# Patient Record
Sex: MALE | Race: WHITE | NOT HISPANIC OR LATINO | URBAN - METROPOLITAN AREA
[De-identification: names, ages, dates, MRNs, and addresses within clinical notes are randomized per-mention and may not be internally consistent; named-entity substitution may affect disease eponyms.]

---

## 2022-09-12 ENCOUNTER — WEB ENCOUNTER (OUTPATIENT)
Dept: URBAN - METROPOLITAN AREA CLINIC 52 | Facility: CLINIC | Age: 47
End: 2022-09-12

## 2022-09-12 ENCOUNTER — LAB OUTSIDE AN ENCOUNTER (OUTPATIENT)
Dept: URBAN - METROPOLITAN AREA CLINIC 52 | Facility: CLINIC | Age: 47
End: 2022-09-12

## 2022-09-12 ENCOUNTER — CLAIMS CREATED FROM THE CLAIM WINDOW (OUTPATIENT)
Dept: URBAN - METROPOLITAN AREA CLINIC 52 | Facility: CLINIC | Age: 47
End: 2022-09-12
Payer: SELF-PAY

## 2022-09-12 VITALS
SYSTOLIC BLOOD PRESSURE: 131 MMHG | HEIGHT: 73 IN | TEMPERATURE: 97.3 F | DIASTOLIC BLOOD PRESSURE: 90 MMHG | BODY MASS INDEX: 21.95 KG/M2 | OXYGEN SATURATION: 96 % | HEART RATE: 105 BPM | WEIGHT: 165.6 LBS

## 2022-09-12 DIAGNOSIS — R11.2 NAUSEA AND VOMITING, UNSPECIFIED VOMITING TYPE: ICD-10-CM

## 2022-09-12 DIAGNOSIS — K76.0 FATTY LIVER: ICD-10-CM

## 2022-09-12 DIAGNOSIS — R19.7 DIARRHEA, UNSPECIFIED TYPE: ICD-10-CM

## 2022-09-12 DIAGNOSIS — K92.1 BLOOD IN STOOL: ICD-10-CM

## 2022-09-12 DIAGNOSIS — K81.9 CHOLECYSTITIS: ICD-10-CM

## 2022-09-12 DIAGNOSIS — K29.80 DUODENITIS: ICD-10-CM

## 2022-09-12 DIAGNOSIS — R10.84 GENERALIZED ABDOMINAL PAIN: ICD-10-CM

## 2022-09-12 DIAGNOSIS — R63.4 UNINTENTIONAL WEIGHT LOSS: ICD-10-CM

## 2022-09-12 PROBLEM — 72007001: Status: ACTIVE | Noted: 2022-09-12

## 2022-09-12 PROCEDURE — 99204 OFFICE O/P NEW MOD 45 MIN: CPT | Performed by: INTERNAL MEDICINE

## 2022-09-12 PROCEDURE — 99204 OFFICE O/P NEW MOD 45 MIN: CPT | Performed by: PHYSICIAN ASSISTANT

## 2022-09-12 RX ORDER — ONDANSETRON HYDROCHLORIDE 4 MG/1
1 TABLET TABLET, FILM COATED ORAL
Qty: 30 | Refills: 1 | OUTPATIENT
Start: 2022-09-12

## 2022-09-12 RX ORDER — KETOROLAC TROMETHAMINE 10 MG/1
TAKE 1 TABLET BY MOUTH TWICE DAILY TABLET, FILM COATED ORAL
Qty: 20 EACH | Refills: 0 | Status: ON HOLD | COMMUNITY

## 2022-09-12 RX ORDER — DICYCLOMINE HYDROCHLORIDE 20 MG/1
1 TABLET TABLET ORAL
Qty: 90 | Refills: 0 | OUTPATIENT
Start: 2022-09-12 | End: 2022-10-12

## 2022-09-12 RX ORDER — OMEPRAZOLE 20 MG/1
1 CAPSULE 30 MINUTES BEFORE MORNING MEAL CAPSULE, DELAYED RELEASE ORAL ONCE A DAY
Qty: 30 | Refills: 2 | OUTPATIENT
Start: 2022-09-12

## 2022-09-12 NOTE — PHYSICAL EXAM GASTROINTESTINAL
Abdomen , soft, nondistended, RUQ tenderness on palpation, no guarding or rigidity , no masses palpable , normal bowel sounds

## 2022-09-12 NOTE — PHYSICAL EXAM HENT:
Head,  normocephalic,  atraumatic,  Face,  Face within normal limits,  Ears,  External ears within normal limits Statement Selected

## 2022-09-12 NOTE — HPI-TODAY'S VISIT:
This is a 47 y.o. male with no significant PMH who presents to office with multiple complaints. According to patient, he has had abdominal pain, rectal bleeding, and diarrhea for two months.  Abdominal pain is associated with bloating, decreased appetite, nausea and vomiting. He reports dark, "coca-cola" colored vomit; denies bloody vomit. He reports 2-3 watery stools a day. Bowel movements are associated with intermittent bright red blood in toilet and on tissue. He has loss 30 pounds, unintentionally, over the past year. He does report heavy alcohol use, and admits to drinking 12 beers a day x 20 years. CT of abdomen and pelvis on 8/17/22 suggestive of multi-focal colitis most prominent following the ascending segement; also suggestive of duodenitis and probable hepatic steatosis. There was mild gallbladder prominence with equivocal gallbladder wall thickening. RUQ u/s recommended.

## 2022-10-11 ENCOUNTER — CLAIMS CREATED FROM THE CLAIM WINDOW (OUTPATIENT)
Dept: URBAN - METROPOLITAN AREA SURGERY CENTER 17 | Facility: SURGERY CENTER | Age: 47
End: 2022-10-11
Payer: SELF-PAY

## 2022-10-11 ENCOUNTER — CLAIMS CREATED FROM THE CLAIM WINDOW (OUTPATIENT)
Dept: URBAN - METROPOLITAN AREA SURGERY CENTER 17 | Facility: SURGERY CENTER | Age: 47
End: 2022-10-11

## 2022-10-11 ENCOUNTER — CLAIMS CREATED FROM THE CLAIM WINDOW (OUTPATIENT)
Dept: URBAN - METROPOLITAN AREA CLINIC 4 | Facility: CLINIC | Age: 47
End: 2022-10-11
Payer: SELF-PAY

## 2022-10-11 DIAGNOSIS — R10.13 ABDOMINAL DISCOMFORT, EPIGASTRIC: ICD-10-CM

## 2022-10-11 DIAGNOSIS — R11.2 NAUSEA AND VOMITING, UNSPECIFIED VOMITING TYPE: ICD-10-CM

## 2022-10-11 DIAGNOSIS — K63.89 OTHER SPECIFIED DISEASES OF INTESTINE: ICD-10-CM

## 2022-10-11 DIAGNOSIS — K22.89 DILATATION OF ESOPHAGUS: ICD-10-CM

## 2022-10-11 DIAGNOSIS — K31.89 ACQUIRED DEFORMITY OF DUODENUM: ICD-10-CM

## 2022-10-11 DIAGNOSIS — K63.89 BACTERIAL OVERGROWTH SYNDROME: ICD-10-CM

## 2022-10-11 DIAGNOSIS — D12.5 ADENOMA OF SIGMOID COLON: ICD-10-CM

## 2022-10-11 DIAGNOSIS — K31.89 OTHER DISEASES OF STOMACH AND DUODENUM: ICD-10-CM

## 2022-10-11 DIAGNOSIS — R10.84 GENERALIZED ABDOMINAL PAIN: ICD-10-CM

## 2022-10-11 DIAGNOSIS — R11.2 ACUTE NAUSEA WITH NONBILIOUS VOMITING: ICD-10-CM

## 2022-10-11 DIAGNOSIS — R19.7 ACUTE DIARRHEA: ICD-10-CM

## 2022-10-11 DIAGNOSIS — K62.5 ANAL BLEEDING: ICD-10-CM

## 2022-10-11 PROCEDURE — 88342 IMHCHEM/IMCYTCHM 1ST ANTB: CPT | Performed by: PATHOLOGY

## 2022-10-11 PROCEDURE — G8907 PT DOC NO EVENTS ON DISCHARG: HCPCS | Performed by: INTERNAL MEDICINE

## 2022-10-11 PROCEDURE — 45385 COLONOSCOPY W/LESION REMOVAL: CPT | Performed by: INTERNAL MEDICINE

## 2022-10-11 PROCEDURE — 88312 SPECIAL STAINS GROUP 1: CPT | Performed by: PATHOLOGY

## 2022-10-11 PROCEDURE — 43239 EGD BIOPSY SINGLE/MULTIPLE: CPT | Performed by: INTERNAL MEDICINE

## 2022-10-11 PROCEDURE — 88305 TISSUE EXAM BY PATHOLOGIST: CPT | Performed by: PATHOLOGY

## 2022-10-11 PROCEDURE — 88313 SPECIAL STAINS GROUP 2: CPT | Performed by: PATHOLOGY

## 2022-10-11 PROCEDURE — 45380 COLONOSCOPY AND BIOPSY: CPT | Performed by: INTERNAL MEDICINE

## 2022-10-11 RX ORDER — ONDANSETRON HYDROCHLORIDE 4 MG/1
1 TABLET TABLET, FILM COATED ORAL
Qty: 30 | Refills: 1 | Status: ACTIVE | COMMUNITY
Start: 2022-09-12

## 2022-10-11 RX ORDER — DICYCLOMINE HYDROCHLORIDE 20 MG/1
1 TABLET TABLET ORAL
Qty: 90 | Refills: 0 | Status: ACTIVE | COMMUNITY
Start: 2022-09-12 | End: 2022-10-12

## 2022-10-11 RX ORDER — OMEPRAZOLE 40 MG/1
1 CAPSULE 30 MINUTES BEFORE MORNING MEAL CAPSULE, DELAYED RELEASE PELLETS ORAL ONCE A DAY
Qty: 30 | Refills: 6
Start: 2022-09-12

## 2022-10-11 RX ORDER — ONDANSETRON HYDROCHLORIDE 4 MG/1
1 TABLET TABLET, FILM COATED ORAL
Qty: 60 | Refills: 1
Start: 2022-09-12

## 2022-10-11 RX ORDER — DICYCLOMINE HYDROCHLORIDE 20 MG/1
1 TABLET TABLET ORAL
Qty: 90 | Refills: 6
Start: 2022-09-12 | End: 2023-05-09

## 2022-10-11 RX ORDER — OMEPRAZOLE 20 MG/1
1 CAPSULE 30 MINUTES BEFORE MORNING MEAL CAPSULE, DELAYED RELEASE ORAL ONCE A DAY
Qty: 30 | Refills: 2 | Status: ACTIVE | COMMUNITY
Start: 2022-09-12

## 2022-10-11 RX ORDER — KETOROLAC TROMETHAMINE 10 MG/1
TAKE 1 TABLET BY MOUTH TWICE DAILY TABLET, FILM COATED ORAL
Qty: 20 EACH | Refills: 0 | Status: ON HOLD | COMMUNITY

## 2022-11-07 ENCOUNTER — OFFICE VISIT (OUTPATIENT)
Dept: URBAN - METROPOLITAN AREA CLINIC 52 | Facility: CLINIC | Age: 47
End: 2022-11-07

## 2022-11-07 RX ORDER — OMEPRAZOLE 40 MG/1
1 CAPSULE 30 MINUTES BEFORE MORNING MEAL CAPSULE, DELAYED RELEASE PELLETS ORAL ONCE A DAY
Qty: 30 | Refills: 6 | Status: ON HOLD | COMMUNITY
Start: 2022-09-12

## 2022-11-07 RX ORDER — DICYCLOMINE HYDROCHLORIDE 20 MG/1
1 TABLET TABLET ORAL
Qty: 90 | Refills: 6 | Status: ON HOLD | COMMUNITY
Start: 2022-09-12 | End: 2023-05-09

## 2022-11-07 RX ORDER — ONDANSETRON HYDROCHLORIDE 4 MG/1
1 TABLET TABLET, FILM COATED ORAL
Qty: 60 | Refills: 1 | Status: ON HOLD | COMMUNITY
Start: 2022-09-12

## 2022-11-07 RX ORDER — KETOROLAC TROMETHAMINE 10 MG/1
TAKE 1 TABLET BY MOUTH TWICE DAILY TABLET, FILM COATED ORAL
Qty: 20 EACH | Refills: 0 | COMMUNITY

## 2022-11-30 ENCOUNTER — CLAIMS CREATED FROM THE CLAIM WINDOW (OUTPATIENT)
Dept: URBAN - METROPOLITAN AREA CLINIC 52 | Facility: CLINIC | Age: 47
End: 2022-11-30
Payer: SELF-PAY

## 2022-11-30 VITALS
HEART RATE: 123 BPM | BODY MASS INDEX: 21.44 KG/M2 | WEIGHT: 161.8 LBS | HEIGHT: 73 IN | OXYGEN SATURATION: 98 % | DIASTOLIC BLOOD PRESSURE: 84 MMHG | TEMPERATURE: 97.5 F | SYSTOLIC BLOOD PRESSURE: 137 MMHG

## 2022-11-30 DIAGNOSIS — K76.0 FATTY LIVER: ICD-10-CM

## 2022-11-30 DIAGNOSIS — K63.5 HYPERPLASTIC POLYP OF SIGMOID COLON: ICD-10-CM

## 2022-11-30 DIAGNOSIS — D12.5 ADENOMATOUS POLYP OF SIGMOID COLON: ICD-10-CM

## 2022-11-30 DIAGNOSIS — K21.00 GASTROESOPHAGEAL REFLUX DISEASE WITH ESOPHAGITIS WITHOUT HEMORRHAGE: ICD-10-CM

## 2022-11-30 PROBLEM — 197321007: Status: ACTIVE | Noted: 2022-09-12

## 2022-11-30 PROBLEM — 266433003: Status: ACTIVE | Noted: 2022-11-30

## 2022-11-30 PROCEDURE — 99213 OFFICE O/P EST LOW 20 MIN: CPT | Performed by: PHYSICIAN ASSISTANT

## 2022-11-30 PROCEDURE — 99213 OFFICE O/P EST LOW 20 MIN: CPT | Performed by: INTERNAL MEDICINE

## 2022-11-30 RX ORDER — OMEPRAZOLE 40 MG/1
1 CAPSULE 30 MINUTES BEFORE MORNING MEAL CAPSULE, DELAYED RELEASE PELLETS ORAL ONCE A DAY
Qty: 30 | Refills: 6 | Status: ACTIVE | COMMUNITY
Start: 2022-09-12

## 2022-11-30 RX ORDER — DICYCLOMINE HYDROCHLORIDE 20 MG/1
1 TABLET TABLET ORAL
Qty: 90 | Refills: 6 | Status: ACTIVE | COMMUNITY
Start: 2022-09-12 | End: 2023-05-09

## 2022-11-30 RX ORDER — ONDANSETRON HYDROCHLORIDE 4 MG/1
1 TABLET TABLET, FILM COATED ORAL
Qty: 60 | Refills: 1 | Status: ACTIVE | COMMUNITY
Start: 2022-09-12

## 2022-11-30 RX ORDER — KETOROLAC TROMETHAMINE 10 MG/1
TAKE 1 TABLET BY MOUTH TWICE DAILY TABLET, FILM COATED ORAL
Qty: 20 EACH | Refills: 0 | COMMUNITY

## 2022-11-30 NOTE — HPI-TODAY'S VISIT:
This is a 47 y.o. male with no significant PMH who initially presented to office with multiple complaints. Initially he, was c/o generalized abdominal pain, rectal bleeding, and diarrhea for two months.  Abdominal pain was associated with bloating, decreased appetite, nausea and vomiting. He reported dark, "coca-cola" colored vomit; denies bloody vomit. He was having 2-3 watery stools a day. Bowel movements were associated with intermittent bright red blood in toilet and on tissue. He was having weight loss of 30 pounds, unintentionally, over the past year. He reported heavy alcohol use, and admits to drinking 12 beers a day x 20 years. Stool studies were ordered on last office visit, but patient states cost was to expensive, and he did not get. His weight is stable. CT of abdomen and pelvis on 8/17/22 suggestive of multi-focal colitis most prominent following the ascending segment; also suggestive of duodenitis and probable hepatic steatosis. There was mild gallbladder prominence with equivocal gallbladder wall thickening. RUQ u/s recommended. Nausea, vomiting has resolved. Abdominal pain is much better with Omeprazole and Dicyclomine.  RUQ ultrasound on 11/10/22 with diffuse fatty liver. EGD 10/11/2022 (Dr. Wagner) with LA Grade C reflux esophagitis, medium-sized HH, and gastritis. Pathology negative for H. pylori. Colonoscopy 10/11/2022 (Dr. Wagner) with 5-mm descending colon polyp (tubular adenoma) and 10-mm sigmoid colon polyp (hyperplastic); redundant colon and IH.

## 2022-11-30 NOTE — PHYSICAL EXAM GASTROINTESTINAL
Abdomen , soft, nondistended, non-tender, no guarding or rigidity , no masses palpable , normal bowel sounds

## 2023-05-26 ENCOUNTER — OFFICE VISIT (OUTPATIENT)
Dept: URBAN - METROPOLITAN AREA CLINIC 52 | Facility: CLINIC | Age: 48
End: 2023-05-26

## 2023-09-05 ENCOUNTER — OFFICE VISIT (OUTPATIENT)
Dept: URBAN - METROPOLITAN AREA CLINIC 70 | Facility: CLINIC | Age: 48
End: 2023-09-05
Payer: SELF-PAY

## 2023-09-05 ENCOUNTER — LAB OUTSIDE AN ENCOUNTER (OUTPATIENT)
Dept: URBAN - METROPOLITAN AREA CLINIC 70 | Facility: CLINIC | Age: 48
End: 2023-09-05

## 2023-09-05 VITALS
SYSTOLIC BLOOD PRESSURE: 105 MMHG | BODY MASS INDEX: 24.57 KG/M2 | WEIGHT: 185.4 LBS | HEART RATE: 128 BPM | TEMPERATURE: 99.7 F | HEIGHT: 73 IN | DIASTOLIC BLOOD PRESSURE: 73 MMHG

## 2023-09-05 DIAGNOSIS — E87.1 HYPONATREMIA: ICD-10-CM

## 2023-09-05 DIAGNOSIS — K70.31 ALCOHOLIC CIRRHOSIS OF LIVER WITH ASCITES: ICD-10-CM

## 2023-09-05 PROBLEM — 420054005: Status: ACTIVE | Noted: 2023-09-05

## 2023-09-05 PROBLEM — 89627008: Status: ACTIVE | Noted: 2023-09-05

## 2023-09-05 PROCEDURE — 99215 OFFICE O/P EST HI 40 MIN: CPT | Performed by: INTERNAL MEDICINE

## 2023-09-05 RX ORDER — LORAZEPAM 1 MG/1
1 TABLET AT BEDTIME AS NEEDED TABLET ORAL ONCE A DAY
Status: ACTIVE | COMMUNITY

## 2023-09-05 RX ORDER — ONDANSETRON HYDROCHLORIDE 4 MG/1
1 TABLET TABLET, FILM COATED ORAL
Qty: 60 | Refills: 1 | Status: ACTIVE | COMMUNITY
Start: 2022-09-12

## 2023-09-05 RX ORDER — OMEPRAZOLE 40 MG/1
1 CAPSULE 30 MINUTES BEFORE MORNING MEAL CAPSULE, DELAYED RELEASE PELLETS ORAL ONCE A DAY
Qty: 30 | Refills: 6 | Status: ACTIVE | COMMUNITY
Start: 2022-09-12

## 2023-09-05 RX ORDER — KETOROLAC TROMETHAMINE 10 MG/1
TAKE 1 TABLET BY MOUTH TWICE DAILY TABLET, FILM COATED ORAL
Qty: 20 EACH | Refills: 0 | COMMUNITY

## 2023-09-05 NOTE — HPI-TODAY'S VISIT:
48-year-old male here for hospital discharge follow-up.  Patient presented to [facility] 08/18 with complaint of generalized abdominal pain.  Underwent CT scans that showed cirrhosis of the liver, large volume abdominal ascites, moderate size left pleural effusion with left basilar subsegmental atelectasis.  Ultrasound abdomen showed cirrhosis with splenomegaly, and ascites.  Gallbladder sludge without calculus or cholecystitis.  Patient underwent paracentesis and 9 L of clear mesfin ascites fluid was removed.  Blood work from 8/18 showed sodium 102, potassium 4.7, creatinine 1.61, calcium 7.4, ascites fluid noted negative for SBP.  Patient was admitted to ICU for hyponatremia. Patient's sodium improved gradually to 128 over the course of hospital stay.  Patient left AMA on 8/27, patient's sodium was 128 on the day he left AMA.   Of note patient went had repeat paracentesis on 8/25 and 8 L of fluid was removed.  Also noted to have acute encephalopathy during hospital stay, noted to be confused by the time he left the hospital.   At today's visit, patient appears slightly confused although alert and oriented to time, place and person. Patient reports having abdominal distension and generalized abdominal pain. Per family present at bedside, his most recent sodium 3 days ago was ?120 (documentation not available). Reports undocumented weight loss. Denies having nausea, vomiting, confusion, hematochezia, hematemesis.

## 2023-09-20 ENCOUNTER — OFFICE VISIT (OUTPATIENT)
Dept: URBAN - METROPOLITAN AREA CLINIC 70 | Facility: CLINIC | Age: 48
End: 2023-09-20
Payer: SELF-PAY

## 2023-09-20 VITALS
WEIGHT: 168.4 LBS | TEMPERATURE: 98.1 F | SYSTOLIC BLOOD PRESSURE: 108 MMHG | HEIGHT: 73 IN | HEART RATE: 109 BPM | BODY MASS INDEX: 22.32 KG/M2 | DIASTOLIC BLOOD PRESSURE: 69 MMHG

## 2023-09-20 DIAGNOSIS — K70.31 ALCOHOLIC CIRRHOSIS OF LIVER WITH ASCITES: ICD-10-CM

## 2023-09-20 DIAGNOSIS — K76.82 HEPATIC ENCEPHALOPATHY: ICD-10-CM

## 2023-09-20 PROCEDURE — 99215 OFFICE O/P EST HI 40 MIN: CPT | Performed by: INTERNAL MEDICINE

## 2023-09-20 RX ORDER — ONDANSETRON HYDROCHLORIDE 4 MG/1
1 TABLET TABLET, FILM COATED ORAL
Qty: 60 | Refills: 1 | Status: ACTIVE | COMMUNITY
Start: 2022-09-12

## 2023-09-20 RX ORDER — RIFAXIMIN 200 MG/1
1 TABLET TABLET ORAL TWICE A DAY
Qty: 180 TABLET | Refills: 3 | OUTPATIENT
Start: 2023-09-20 | End: 2024-09-14

## 2023-09-20 RX ORDER — KETOROLAC TROMETHAMINE 10 MG/1
TAKE 1 TABLET BY MOUTH TWICE DAILY TABLET, FILM COATED ORAL
Qty: 20 EACH | Refills: 0 | COMMUNITY

## 2023-09-20 RX ORDER — OMEPRAZOLE 40 MG/1
1 CAPSULE 30 MINUTES BEFORE MORNING MEAL CAPSULE, DELAYED RELEASE PELLETS ORAL ONCE A DAY
Qty: 30 | Refills: 6 | Status: ACTIVE | COMMUNITY
Start: 2022-09-12

## 2023-09-20 RX ORDER — LORAZEPAM 1 MG/1
1 TABLET AT BEDTIME AS NEEDED TABLET ORAL ONCE A DAY
Status: ACTIVE | COMMUNITY

## 2023-09-20 NOTE — HPI-TODAY'S VISIT:
The pt presents for a hospital f/u for cirrhosis. He was sent to the hospital by Dr. Hoover following his LOV.  He was hospitalized at [facility] 9/5/2023 - 9/15/2023 for hyponatremia.  While hospitalized, he was seen by nephrology.  GI was never consulted.  Labs 9/9/2023 showed Sodium 121, albumin 1.8, bili0.4, alk phos 95, AST 54, and ALT 33.  Labs 9/15/2023 showed Hgb 8.6, Hct 24.8, MCV 95, Plt 212, sodium 132, albumin 1.5, bili 0.6, alk phos 83, AST 42, and ALT 27.  He underwent a paracentesis on 9/6/2023 and 11,300ml of yellow clear fluid was removed.  Today he states he is feeling well.  Abdomen is distended, but he denies abdomial pain.  Weight has maintained.  He is alert and oriented to person, place, and time today.  He is no longer taking Lactulose which was given in the hospital for HE.  He has upcoming f/u with Nephrology in 2-3 weeks. Not currently on diuretics.  No current referral to [facility].  Reports abstinence from ETOH for the last 1.5 months.  His wife is present for the office visit today.

## 2023-09-20 NOTE — PHYSICAL EXAM HENT:
Head,  normocephalic,  atraumatic,  Face,  Face within normal limits,  Ears,  External ears within normal limits,  Nose/Nasopharynx,  External nose  normal appearance, Mouth and Throat,  Breath odor normal,  Lips,  Appearance normal I called Dr. Headley at [facility] about patient transfer.     Sammy Kwon  12/10/20 2029

## 2023-09-20 NOTE — HPI-OTHER HISTORIES
OV 9/5/2023 w/ Dr. Hoover: 48-year-old male here for hospital discharge follow-up.  Patient presented to [facility] 08/18 with complaint of generalized abdominal pain.  Underwent CT scans that showed cirrhosis of the liver, large volume abdominal ascites, moderate size left pleural effusion with left basilar subsegmental atelectasis.  Ultrasound abdomen showed cirrhosis with splenomegaly, and ascites.  Gallbladder sludge without calculus or cholecystitis.  Patient underwent paracentesis and 9 L of clear mesfin ascites fluid was removed.  Blood work from 8/18 showed sodium 102, potassium 4.7, creatinine 1.61, calcium 7.4, ascites fluid noted negative for SBP.  Patient was admitted to ICU for hyponatremia. Patient's sodium improved gradually to 128 over the course of hospital stay.  Patient left AMA on 8/27, patient's sodium was 128 on the day he left AMA.   Of note patient went had repeat paracentesis on 8/25 and 8 L of fluid was removed.  Also noted to have acute encephalopathy during hospital stay, noted to be confused by the time he left the hospital.   At today's visit, patient appears slightly confused although alert and oriented to time, place and person. Patient reports having abdominal distension and generalized abdominal pain. Per family present at bedside, his most recent sodium 3 days ago was ?120 (documentation not available). Reports undocumented weight loss. Denies having nausea, vomiting, confusion, hematochezia, hematemesis.

## 2023-09-22 ENCOUNTER — TELEPHONE ENCOUNTER (OUTPATIENT)
Dept: URBAN - METROPOLITAN AREA CLINIC 70 | Facility: CLINIC | Age: 48
End: 2023-09-22

## 2023-09-22 RX ORDER — RIFAXIMIN 550 MG/1
1 TABLET TABLET ORAL TWICE A DAY
Qty: 180 | Refills: 3
Start: 2023-09-20 | End: 2024-09-16

## 2023-09-25 LAB
A/G RATIO: 0.5
AFP, SERUM, TUMOR MARKER: 4.8
ALBUMIN: 2
ALKALINE PHOSPHATASE: 86
ALT (SGPT): 25
AST (SGOT): 33
BILIRUBIN, TOTAL: 0.6
BUN/CREATININE RATIO: (no result)
BUN: 18
CALCIUM: 7.2
CARBON DIOXIDE, TOTAL: 17
CHLORIDE: 102
CREATININE: 1.16
EGFR: 78
GLOBULIN, TOTAL: 3.7
GLUCOSE: 96
HEMATOCRIT: 25.8
HEMOGLOBIN: 9.3
INR: 1.1
MCH: 33
MCHC: 36
MCV: 91.5
MPV: 10
PLATELET COUNT: 177
POTASSIUM: 3.4
PROTEIN, TOTAL: 5.7
PT: 11.1
RDW: 12.5
RED BLOOD CELL COUNT: 2.82
SODIUM: 128
WHITE BLOOD CELL COUNT: 5.4

## 2023-09-27 ENCOUNTER — TELEPHONE ENCOUNTER (OUTPATIENT)
Dept: URBAN - METROPOLITAN AREA CLINIC 70 | Facility: CLINIC | Age: 48
End: 2023-09-27

## 2023-09-27 ENCOUNTER — LAB OUTSIDE AN ENCOUNTER (OUTPATIENT)
Dept: URBAN - METROPOLITAN AREA CLINIC 70 | Facility: CLINIC | Age: 48
End: 2023-09-27

## 2023-10-12 ENCOUNTER — TELEPHONE ENCOUNTER (OUTPATIENT)
Dept: URBAN - METROPOLITAN AREA CLINIC 70 | Facility: CLINIC | Age: 48
End: 2023-10-12

## 2023-10-17 ENCOUNTER — TELEPHONE ENCOUNTER (OUTPATIENT)
Dept: URBAN - METROPOLITAN AREA CLINIC 70 | Facility: CLINIC | Age: 48
End: 2023-10-17

## 2023-10-18 ENCOUNTER — TELEPHONE ENCOUNTER (OUTPATIENT)
Dept: URBAN - METROPOLITAN AREA CLINIC 70 | Facility: CLINIC | Age: 48
End: 2023-10-18

## 2023-10-23 ENCOUNTER — LAB OUTSIDE AN ENCOUNTER (OUTPATIENT)
Dept: URBAN - METROPOLITAN AREA CLINIC 70 | Facility: CLINIC | Age: 48
End: 2023-10-23

## 2023-10-31 ENCOUNTER — TELEPHONE ENCOUNTER (OUTPATIENT)
Dept: URBAN - METROPOLITAN AREA CLINIC 70 | Facility: CLINIC | Age: 48
End: 2023-10-31

## 2023-11-03 ENCOUNTER — LAB OUTSIDE AN ENCOUNTER (OUTPATIENT)
Dept: URBAN - METROPOLITAN AREA CLINIC 70 | Facility: CLINIC | Age: 48
End: 2023-11-03

## 2023-11-03 PROBLEM — 1082601000119104: Status: ACTIVE | Noted: 2023-11-03

## 2023-11-06 ENCOUNTER — TELEPHONE ENCOUNTER (OUTPATIENT)
Dept: URBAN - METROPOLITAN AREA CLINIC 70 | Facility: CLINIC | Age: 48
End: 2023-11-06

## 2023-11-08 ENCOUNTER — CLAIMS CREATED FROM THE CLAIM WINDOW (OUTPATIENT)
Dept: URBAN - METROPOLITAN AREA CLINIC 70 | Facility: CLINIC | Age: 48
End: 2023-11-08
Payer: SELF-PAY

## 2023-11-08 ENCOUNTER — TELEPHONE ENCOUNTER (OUTPATIENT)
Dept: URBAN - METROPOLITAN AREA CLINIC 70 | Facility: CLINIC | Age: 48
End: 2023-11-08

## 2023-11-08 ENCOUNTER — LAB OUTSIDE AN ENCOUNTER (OUTPATIENT)
Dept: URBAN - METROPOLITAN AREA CLINIC 70 | Facility: CLINIC | Age: 48
End: 2023-11-08

## 2023-11-08 VITALS
BODY MASS INDEX: 22.85 KG/M2 | DIASTOLIC BLOOD PRESSURE: 58 MMHG | HEIGHT: 73 IN | SYSTOLIC BLOOD PRESSURE: 88 MMHG | HEART RATE: 102 BPM | WEIGHT: 172.4 LBS | TEMPERATURE: 99.3 F

## 2023-11-08 DIAGNOSIS — K70.31 ALCOHOLIC CIRRHOSIS OF LIVER WITH ASCITES: ICD-10-CM

## 2023-11-08 DIAGNOSIS — K76.82 HEPATIC ENCEPHALOPATHY: ICD-10-CM

## 2023-11-08 PROCEDURE — 99214 OFFICE O/P EST MOD 30 MIN: CPT | Performed by: INTERNAL MEDICINE

## 2023-11-08 PROCEDURE — 99214 OFFICE O/P EST MOD 30 MIN: CPT | Performed by: REGISTERED NURSE

## 2023-11-08 RX ORDER — ONDANSETRON HYDROCHLORIDE 4 MG/1
1 TABLET TABLET, FILM COATED ORAL
Qty: 60 | Refills: 1 | Status: DISCONTINUED | COMMUNITY
Start: 2022-09-12

## 2023-11-08 RX ORDER — RIFAXIMIN 550 MG/1
1 TABLET TABLET ORAL TWICE A DAY
Qty: 180 | Refills: 3 | Status: DISCONTINUED | COMMUNITY
Start: 2023-09-20 | End: 2024-09-16

## 2023-11-08 RX ORDER — LORAZEPAM 1 MG/1
1 TABLET AT BEDTIME AS NEEDED TABLET ORAL ONCE A DAY
Status: DISCONTINUED | COMMUNITY

## 2023-11-08 RX ORDER — SODIUM CHLORIDE TAB 1 GM 1 G
AS DIRECTED TAB MISCELLANEOUS
Status: ACTIVE | COMMUNITY

## 2023-11-08 RX ORDER — LACTULOSE 10 G/15ML
15 ML AS NEEDED SOLUTION ORAL TWICE A DAY
Qty: 900 ML | Refills: 1 | OUTPATIENT
Start: 2023-11-08 | End: 2024-01-07

## 2023-11-08 RX ORDER — POTASSIUM CHLORIDE 1500 MG/1
1 TABLET WITH FOOD TABLET, EXTENDED RELEASE ORAL ONCE A DAY
Status: ACTIVE | COMMUNITY

## 2023-11-08 RX ORDER — KETOROLAC TROMETHAMINE 10 MG/1
TAKE 1 TABLET BY MOUTH TWICE DAILY TABLET, FILM COATED ORAL
Qty: 20 EACH | Refills: 0 | Status: DISCONTINUED | COMMUNITY

## 2023-11-08 RX ORDER — OMEPRAZOLE 40 MG/1
1 CAPSULE 30 MINUTES BEFORE MORNING MEAL CAPSULE, DELAYED RELEASE PELLETS ORAL ONCE A DAY
Qty: 30 | Refills: 6 | Status: ACTIVE | COMMUNITY
Start: 2022-09-12

## 2023-11-08 RX ORDER — TORSEMIDE 20 MG/1
AS DIRECTED TABLET ORAL
Status: ACTIVE | COMMUNITY

## 2023-11-08 RX ORDER — LORAZEPAM 1 MG/1
1 TABLET AT BEDTIME AS NEEDED TABLET ORAL ONCE A DAY
Status: ACTIVE | COMMUNITY

## 2023-11-08 NOTE — HPI-TODAY'S VISIT:
Pt presents today with worsening ascites. He has been getting a paracentesis every week. Most recent paracentesis was on 11/3/23 with 11L of fluid removed. Diuretics are being managed by nephrology. They recently added torsemide. He has not been taking the lactulose. He c/o difficulty sleeping and wife reports mild confusion at times. He is no longer drinking alcohol.   CT 8/18/23 showed cirrrhosis and large volume ascites EGD 10/11/22 showed a hiatal hernia, esophagitis(no Maxwell's), and gastritis(no hpylori). No varices noted. Colonoscopy 10/11/22 showed a tubular adenoma in the descending colon(5 yr recall)

## 2023-11-08 NOTE — HPI-OTHER HISTORIES
OV 9/5/2023 w/ Dr. Hoover: 48-year-old male here for hospital discharge follow-up.  Patient presented to [facility] 08/18 with complaint of generalized abdominal pain.  Underwent CT scans that showed cirrhosis of the liver, large volume abdominal ascites, moderate size left pleural effusion with left basilar subsegmental atelectasis.  Ultrasound abdomen showed cirrhosis with splenomegaly, and ascites.  Gallbladder sludge without calculus or cholecystitis.  Patient underwent paracentesis and 9 L of clear mesfin ascites fluid was removed.  Blood work from 8/18 showed sodium 102, potassium 4.7, creatinine 1.61, calcium 7.4, ascites fluid noted negative for SBP.  Patient was admitted to ICU for hyponatremia. Patient's sodium improved gradually to 128 over the course of hospital stay.  Patient left AMA on 8/27, patient's sodium was 128 on the day he left AMA.   Of note patient went had repeat paracentesis on 8/25 and 8 L of fluid was removed.  Also noted to have acute encephalopathy during hospital stay, noted to be confused by the time he left the hospital.   At today's visit, patient appears slightly confused although alert and oriented to time, place and person. Patient reports having abdominal distension and generalized abdominal pain. Per family present at bedside, his most recent sodium 3 days ago was ?120 (documentation not available). Reports undocumented weight loss. Denies having nausea, vomiting, confusion, hematochezia, hematemesis. ------------------------------------------------------- Note from OV 9/20/23 with RAMON Ku: The pt presents for a hospital f/u for cirrhosis. He was sent to the hospital by Dr. Hoover following his LOV. He was hospitalized at [facility] 9/5/2023 - 9/15/2023 for hyponatremia. While hospitalized, he was seen by nephrology. GI was never consulted. Labs 9/9/2023 showed Sodium 121, albumin 1.8, bili0.4, alk phos 95, AST 54, and ALT 33. Labs 9/15/2023 showed Hgb 8.6, Hct 24.8, MCV 95, Plt 212, sodium 132, albumin 1.5, bili 0.6, alk phos 83, AST 42, and ALT 27. He underwent a paracentesis on 9/6/2023 and 11,300ml of yellow clear fluid was removed. Today he states he is feeling well. Abdomen is distended, but he denies abdomial pain. Weight has maintained. He is alert and oriented to person, place, and time today. He is no longer taking Lactulose which was given in the hospital for HE. He has upcoming f/u with Nephrology in 2-3 weeks. Not currently on diuretics. No current referral to [facility]. Reports abstinence from ETOH for the last 1.5 months. His wife is present for the office visit today. -------------------------------------------------------

## 2023-11-08 NOTE — PHYSICAL EXAM GASTROINTESTINAL
Abdomen , soft, nontender, + ascites , no guarding or rigidity , no masses palpable , normal bowel sounds , Liver and Spleen , no hepatomegaly present , liver nontender

## 2023-11-15 ENCOUNTER — TELEPHONE ENCOUNTER (OUTPATIENT)
Dept: URBAN - METROPOLITAN AREA CLINIC 70 | Facility: CLINIC | Age: 48
End: 2023-11-15

## 2023-11-17 ENCOUNTER — OFFICE VISIT (OUTPATIENT)
Dept: URBAN - METROPOLITAN AREA CLINIC 70 | Facility: CLINIC | Age: 48
End: 2023-11-17

## 2023-11-22 ENCOUNTER — OFFICE VISIT (OUTPATIENT)
Dept: URBAN - METROPOLITAN AREA CLINIC 70 | Facility: CLINIC | Age: 48
End: 2023-11-22

## 2023-11-22 RX ORDER — SODIUM CHLORIDE TAB 1 GM 1 G
AS DIRECTED TAB MISCELLANEOUS
Status: ACTIVE | COMMUNITY

## 2023-11-22 RX ORDER — LACTULOSE 10 G/15ML
15 ML AS NEEDED SOLUTION ORAL TWICE A DAY
Qty: 900 ML | Refills: 1 | Status: ACTIVE | COMMUNITY
Start: 2023-11-08 | End: 2024-01-07

## 2023-11-22 RX ORDER — LORAZEPAM 1 MG/1
1 TABLET AT BEDTIME AS NEEDED TABLET ORAL ONCE A DAY
Status: ACTIVE | COMMUNITY

## 2023-11-22 RX ORDER — POTASSIUM CHLORIDE 1500 MG/1
1 TABLET WITH FOOD TABLET, EXTENDED RELEASE ORAL ONCE A DAY
Status: ACTIVE | COMMUNITY

## 2023-11-22 RX ORDER — OMEPRAZOLE 40 MG/1
1 CAPSULE 30 MINUTES BEFORE MORNING MEAL CAPSULE, DELAYED RELEASE PELLETS ORAL ONCE A DAY
Qty: 30 | Refills: 6 | Status: ACTIVE | COMMUNITY
Start: 2022-09-12

## 2023-11-22 RX ORDER — TORSEMIDE 20 MG/1
AS DIRECTED TABLET ORAL
Status: ACTIVE | COMMUNITY

## 2023-12-08 ENCOUNTER — TELEPHONE ENCOUNTER (OUTPATIENT)
Dept: URBAN - METROPOLITAN AREA CLINIC 70 | Facility: CLINIC | Age: 48
End: 2023-12-08

## 2023-12-08 ENCOUNTER — LAB OUTSIDE AN ENCOUNTER (OUTPATIENT)
Dept: URBAN - METROPOLITAN AREA CLINIC 70 | Facility: CLINIC | Age: 48
End: 2023-12-08

## 2024-05-10 ENCOUNTER — TELEPHONE ENCOUNTER (OUTPATIENT)
Dept: URBAN - METROPOLITAN AREA CLINIC 70 | Facility: CLINIC | Age: 49
End: 2024-05-10

## 2024-05-14 ENCOUNTER — DASHBOARD ENCOUNTERS (OUTPATIENT)
Age: 49
End: 2024-05-14

## 2024-05-14 ENCOUNTER — TELEPHONE ENCOUNTER (OUTPATIENT)
Dept: URBAN - METROPOLITAN AREA CLINIC 70 | Facility: CLINIC | Age: 49
End: 2024-05-14

## 2024-05-14 ENCOUNTER — LAB OUTSIDE AN ENCOUNTER (OUTPATIENT)
Dept: URBAN - METROPOLITAN AREA CLINIC 70 | Facility: CLINIC | Age: 49
End: 2024-05-14

## 2024-05-14 ENCOUNTER — OFFICE VISIT (OUTPATIENT)
Dept: URBAN - METROPOLITAN AREA CLINIC 70 | Facility: CLINIC | Age: 49
End: 2024-05-14
Payer: COMMERCIAL

## 2024-05-14 VITALS
HEIGHT: 73 IN | TEMPERATURE: 98.5 F | DIASTOLIC BLOOD PRESSURE: 78 MMHG | SYSTOLIC BLOOD PRESSURE: 112 MMHG | WEIGHT: 164.5 LBS | HEART RATE: 92 BPM | BODY MASS INDEX: 21.8 KG/M2

## 2024-05-14 DIAGNOSIS — K70.31 ALCOHOLIC CIRRHOSIS OF LIVER WITH ASCITES: ICD-10-CM

## 2024-05-14 PROCEDURE — 99214 OFFICE O/P EST MOD 30 MIN: CPT | Performed by: REGISTERED NURSE

## 2024-05-14 RX ORDER — POTASSIUM CHLORIDE 1500 MG/1
1 TABLET WITH FOOD TABLET, EXTENDED RELEASE ORAL ONCE A DAY
Status: ACTIVE | COMMUNITY

## 2024-05-14 RX ORDER — OMEPRAZOLE 40 MG/1
1 CAPSULE 30 MINUTES BEFORE MORNING MEAL CAPSULE, DELAYED RELEASE PELLETS ORAL ONCE A DAY
Qty: 30 | Refills: 6 | Status: ACTIVE | COMMUNITY
Start: 2022-09-12

## 2024-05-14 RX ORDER — TORSEMIDE 20 MG/1
AS DIRECTED TABLET ORAL
Status: ACTIVE | COMMUNITY

## 2024-05-14 RX ORDER — LORAZEPAM 1 MG/1
1 TABLET AT BEDTIME AS NEEDED TABLET ORAL ONCE A DAY
Status: ACTIVE | COMMUNITY

## 2024-05-14 RX ORDER — SODIUM CHLORIDE TAB 1 GM 1 G
AS DIRECTED TAB MISCELLANEOUS
Status: ACTIVE | COMMUNITY

## 2024-05-23 ENCOUNTER — P2P PATIENT RECORD (OUTPATIENT)
Age: 49
End: 2024-05-23

## 2024-05-29 ENCOUNTER — P2P PATIENT RECORD (OUTPATIENT)
Age: 49
End: 2024-05-29

## 2024-06-06 ENCOUNTER — P2P PATIENT RECORD (OUTPATIENT)
Age: 49
End: 2024-06-06

## 2024-06-13 ENCOUNTER — P2P PATIENT RECORD (OUTPATIENT)
Age: 49
End: 2024-06-13

## 2024-06-20 ENCOUNTER — P2P PATIENT RECORD (OUTPATIENT)
Age: 49
End: 2024-06-20

## 2024-07-03 ENCOUNTER — OFFICE VISIT (OUTPATIENT)
Dept: URBAN - METROPOLITAN AREA CLINIC 70 | Facility: CLINIC | Age: 49
End: 2024-07-03

## 2024-07-03 RX ORDER — POTASSIUM CHLORIDE 1500 MG/1
1 TABLET WITH FOOD TABLET, EXTENDED RELEASE ORAL ONCE A DAY
Status: ACTIVE | COMMUNITY

## 2024-07-03 RX ORDER — TORSEMIDE 20 MG/1
AS DIRECTED TABLET ORAL
Status: ACTIVE | COMMUNITY

## 2024-07-03 RX ORDER — SODIUM CHLORIDE TAB 1 GM 1 G
AS DIRECTED TAB MISCELLANEOUS
Status: ACTIVE | COMMUNITY

## 2024-07-03 RX ORDER — LORAZEPAM 1 MG/1
1 TABLET AT BEDTIME AS NEEDED TABLET ORAL ONCE A DAY
Status: ACTIVE | COMMUNITY

## 2024-07-03 RX ORDER — OMEPRAZOLE 40 MG/1
1 CAPSULE 30 MINUTES BEFORE MORNING MEAL CAPSULE, DELAYED RELEASE PELLETS ORAL ONCE A DAY
Qty: 30 | Refills: 6 | Status: ACTIVE | COMMUNITY
Start: 2022-09-12

## 2024-07-11 ENCOUNTER — P2P PATIENT RECORD (OUTPATIENT)
Age: 49
End: 2024-07-11

## 2024-07-12 ENCOUNTER — P2P PATIENT RECORD (OUTPATIENT)
Age: 49
End: 2024-07-12

## 2024-08-02 ENCOUNTER — P2P PATIENT RECORD (OUTPATIENT)
Age: 49
End: 2024-08-02

## 2024-08-08 ENCOUNTER — CLAIMS CREATED FROM THE CLAIM WINDOW (OUTPATIENT)
Dept: URBAN - METROPOLITAN AREA MEDICAL CENTER 42 | Facility: MEDICAL CENTER | Age: 49
End: 2024-08-08
Payer: SELF-PAY

## 2024-08-08 DIAGNOSIS — K70.30 ALC (ALCOHOLIC LIVER CIRRHOSIS): ICD-10-CM

## 2024-08-08 DIAGNOSIS — K76.82 ACUTE HEPATIC ENCEPHALOPATHY: ICD-10-CM

## 2024-08-08 PROCEDURE — 99253 IP/OBS CNSLTJ NEW/EST LOW 45: CPT | Performed by: INTERNAL MEDICINE

## 2024-08-09 ENCOUNTER — CLAIMS CREATED FROM THE CLAIM WINDOW (OUTPATIENT)
Dept: URBAN - METROPOLITAN AREA MEDICAL CENTER 42 | Facility: MEDICAL CENTER | Age: 49
End: 2024-08-09
Payer: SELF-PAY

## 2024-08-09 DIAGNOSIS — D64.89 ANEMIA DUE TO OTHER CAUSE: ICD-10-CM

## 2024-08-09 DIAGNOSIS — E87.1 HYPONATREMIA: ICD-10-CM

## 2024-08-09 DIAGNOSIS — K70.30 ALC (ALCOHOLIC LIVER CIRRHOSIS): ICD-10-CM

## 2024-08-09 PROCEDURE — 99232 SBSQ HOSP IP/OBS MODERATE 35: CPT | Performed by: INTERNAL MEDICINE

## 2024-08-12 ENCOUNTER — CLAIMS CREATED FROM THE CLAIM WINDOW (OUTPATIENT)
Dept: URBAN - METROPOLITAN AREA MEDICAL CENTER 42 | Facility: MEDICAL CENTER | Age: 49
End: 2024-08-12
Payer: SELF-PAY

## 2024-08-12 DIAGNOSIS — D69.6 THROMBOCYTOPENIA, UNSPECIFIED: ICD-10-CM

## 2024-08-12 DIAGNOSIS — D64.89 ANEMIA DUE TO OTHER CAUSE: ICD-10-CM

## 2024-08-12 DIAGNOSIS — E87.1 HYPONATREMIA: ICD-10-CM

## 2024-08-12 DIAGNOSIS — K70.30 ALCOHOLIC CIRRHOSIS OF LIVER WITHOUT ASCITES: ICD-10-CM

## 2024-08-12 PROCEDURE — 99232 SBSQ HOSP IP/OBS MODERATE 35: CPT | Performed by: INTERNAL MEDICINE

## 2024-08-12 PROCEDURE — 99254 IP/OBS CNSLTJ NEW/EST MOD 60: CPT | Performed by: INTERNAL MEDICINE

## 2024-08-13 ENCOUNTER — CLAIMS CREATED FROM THE CLAIM WINDOW (OUTPATIENT)
Dept: URBAN - METROPOLITAN AREA MEDICAL CENTER 42 | Facility: MEDICAL CENTER | Age: 49
End: 2024-08-13
Payer: SELF-PAY

## 2024-08-13 DIAGNOSIS — K70.31 ALCOHOLIC CIRRHOSIS OF LIVER WITH ASCITES: ICD-10-CM

## 2024-08-13 DIAGNOSIS — K76.82 ACUTE HEPATIC ENCEPHALOPATHY: ICD-10-CM

## 2024-08-13 PROCEDURE — 99232 SBSQ HOSP IP/OBS MODERATE 35: CPT | Performed by: INTERNAL MEDICINE

## 2024-08-14 ENCOUNTER — CLAIMS CREATED FROM THE CLAIM WINDOW (OUTPATIENT)
Dept: URBAN - METROPOLITAN AREA MEDICAL CENTER 42 | Facility: MEDICAL CENTER | Age: 49
End: 2024-08-14
Payer: SELF-PAY

## 2024-08-14 DIAGNOSIS — K70.31 ALCOHOLIC CIRRHOSIS OF LIVER WITH ASCITES: ICD-10-CM

## 2024-08-14 DIAGNOSIS — K76.82 ACUTE HEPATIC ENCEPHALOPATHY: ICD-10-CM

## 2024-08-14 PROCEDURE — 99232 SBSQ HOSP IP/OBS MODERATE 35: CPT | Performed by: INTERNAL MEDICINE

## 2024-08-17 ENCOUNTER — P2P PATIENT RECORD (OUTPATIENT)
Age: 49
End: 2024-08-17